# Patient Record
Sex: MALE | Race: BLACK OR AFRICAN AMERICAN | Employment: FULL TIME
[De-identification: names, ages, dates, MRNs, and addresses within clinical notes are randomized per-mention and may not be internally consistent; named-entity substitution may affect disease eponyms.]

---

## 2022-07-21 ENCOUNTER — HOSPITAL ENCOUNTER (OUTPATIENT)
Dept: MRI IMAGING | Facility: CLINIC | Age: 47
Discharge: HOME | End: 2022-07-23
Payer: COMMERCIAL

## 2022-07-21 DIAGNOSIS — S46.911A STRAIN OF RIGHT SHOULDER, INITIAL ENCOUNTER: ICD-10-CM

## 2022-07-21 PROCEDURE — 73221 MRI JOINT UPR EXTREM W/O DYE: CPT

## 2022-08-03 ENCOUNTER — OFFICE VISIT (OUTPATIENT)
Dept: ORTHOPEDIC SURGERY | Age: 47
End: 2022-08-03
Payer: COMMERCIAL

## 2022-08-03 VITALS
RESPIRATION RATE: 12 BRPM | HEIGHT: 69 IN | SYSTOLIC BLOOD PRESSURE: 156 MMHG | WEIGHT: 185 LBS | DIASTOLIC BLOOD PRESSURE: 94 MMHG | BODY MASS INDEX: 27.4 KG/M2 | HEART RATE: 60 BPM

## 2022-08-03 DIAGNOSIS — S43.421A SPRAIN OF RIGHT ROTATOR CUFF CAPSULE, INITIAL ENCOUNTER: Primary | ICD-10-CM

## 2022-08-03 DIAGNOSIS — S46.011A STRAIN OF TENDON OF RIGHT ROTATOR CUFF, INITIAL ENCOUNTER: ICD-10-CM

## 2022-08-03 DIAGNOSIS — M75.101 ROTATOR CUFF SYNDROME OF RIGHT SHOULDER: ICD-10-CM

## 2022-08-03 DIAGNOSIS — M25.511 RIGHT SHOULDER PAIN, UNSPECIFIED CHRONICITY: Primary | ICD-10-CM

## 2022-08-03 PROCEDURE — 99204 OFFICE O/P NEW MOD 45 MIN: CPT | Performed by: PHYSICIAN ASSISTANT

## 2022-08-03 ASSESSMENT — ENCOUNTER SYMPTOMS
CONSTIPATION: 0
RESPIRATORY NEGATIVE: 1
VOMITING: 0
COUGH: 0
DIARRHEA: 0
ABDOMINAL PAIN: 0
SHORTNESS OF BREATH: 0
APNEA: 0
NAUSEA: 0
CHEST TIGHTNESS: 0
ABDOMINAL DISTENTION: 0
COLOR CHANGE: 0

## 2022-08-03 NOTE — PROGRESS NOTES
[address]  [facility] SP[facility]TS MEDICINE  [address]  [facility] [address]  Dept: [phone]  Dept Fax: [phone]        Right Shoulder - New Patient- [facility]     Chief Complaint:     Chief Complaint   Patient presents with    Shoulder Pain     R Shoulder Pain     HPI:     Demetria Carty is a 48y.o. year old right hand dominant male that has had pain in the right shoulder since 7/9/2022. As far as any trauma to the shoulder, the patient indicates he was picking up a 65# battery when he dropped it and tried to catch it and put it up on a scrap pallet at the same time. He felt something \"pull\" in his shoulder. The pain is worse at night and when doing overhead activities. Weakness of the shoulder has been noted. The pain restricts activities such as movement, pushing/pulling, holding any weight away from his body. The pain does not seem to improve with time. The following interventions/medications have been tried heat, ice, advil, Tylenol. The patient has not had a corticosteroid injection. The patient has not tried physical therapy. The patient has not has surgery. The opposite shoulder is okay. Neck pain has not been present. Review of Systems   Constitutional:  Positive for activity change. Negative for appetite change, fatigue and fever. Respiratory: Negative. Negative for apnea, cough, chest tightness and shortness of breath. Cardiovascular: Negative. Negative for chest pain, palpitations and leg swelling. Gastrointestinal:  Negative for abdominal distention, abdominal pain, constipation, diarrhea, nausea and vomiting. Genitourinary:  Negative for difficulty urinating, dysuria and hematuria. Musculoskeletal:  Positive for arthralgias. Negative for gait problem, joint swelling and myalgias. Skin:  Negative for color change and rash. Neurological:  Positive for weakness.  Negative for dizziness, numbness and headaches. Psychiatric/Behavioral:  Positive for sleep disturbance. Past Medical History:    No past medical history on file. Past Surgical History:    No past surgical history on file. CurrentMedications:   No current outpatient medications on file. No current facility-administered medications for this visit. Allergies:    Patient has no known allergies. Social History:   Social History     Socioeconomic History    Marital status: Legally        Family History:  No family history on file. I have reviewed the CC, HPI, ROS, PMH, FHX, Social History, and if not present in this note, I have reviewed in the patient's chart. I agree with the documentation provided by other staff and have reviewed their documentation prior to providing my signature indicating agreement. Vitals:   BP (!) 156/94   Pulse 60   Resp 12   Ht 5' 9\" (1.753 m)   Wt 185 lb (83.9 kg)   BMI 27.32 kg/m²  Body mass index is 27.32 kg/m². Physical Examination:     Orthopedics:    GENERAL: Alert and oriented X3 in no acute distress. SKIN: Intact without lesions or ulcerations. NEURO: Musculoskeletal and axillary nerves intact to sensory and motor testing. VASC: Capillary refill is less than 3 seconds. Right Shoulder Exam    GEN: Alert and oriented X 3, in no acute distress. SKIN: Intact without rashes, lesions, or ulcerations. NEURO: Musculoskeletal ans axillary nerves intact to sensory and motor testing. VASC: Cap refill less than than 3 secs. Negative Adson's test, Negative Anita's test.  ROM: 90a/150p degrees of forward elevation, 40 degrees of external rotation in neutral, 90 degrees of external rotation in abduction, internal rotation to L1.    STRENGTH: Supraspinatus 4/5, external rotators 5/5. MUSC: No atrophy, negative subscap lift off or belly press test.  IMP: + Neer's sign, + Hawkin's sign, negative Coracoid impingement, + painful arc, + pain with cross body abduction.   PALP: no pain over anterolateral acromion, no pain over AC joint, no pain over traps/rhomboids. INST: negative Monson's test, mild Speed's test  Assessment:     1. Traumatic incomplete tear of right rotator cuff, initial encounter      Procedures:    Procedure: no  Radiology:   SHOULDER X-RAY    Two views of the right shoulder and 2 views of the scapula, including AP, scapular Y, outlet and axillary views reveal: The glenohumeral joint is well reduced no arthritic changes. Proximal migration of the humeral head has not occurred. Acromion is a type II. The acromioclavicular joint shows mild degenerative changes. Impression: Negative radiographs of the right shoulder     MRI SHOULDER RIGHT WO CONTRAST    Result Date: 7/21/2022  1. Less than 50% multifocal shallow partial-thickness articular surface and interstitial tearing of supraspinatus between musculotendinous junction and footplate. Moderate underlying supraspinatus tendinosis. 2. Mild infraspinatus tendinopathy. 3. No paralabral cyst formation or discrete labral tear. 4. Mild degenerative change of the right AC joint. Plan:   Treatment : I reviewed the x-ray and MRI with the patient and I informed them that the MRI does show a partial-thickness tear and interstitial tearing of the supraspinatus between the musculotendinous junction and footplate. He does also have moderate underlying supraspinatus tendinitis. Mild infraspinatus tendinopathy and some mild degenerative changes of the right AC joint. We discussed the etiologies and natural histories of partial-thickness rotator cuff tear. We discussed the various treatment alternatives including anti-inflammatory medications, physical therapy, injections, further imaging studies and as a last result surgery. During today's visit, we had a very long discussion regarding partial-thickness rotator cuff tears with tendinitis and bursitis. The patient was adamant thinking he needed to have a surgery.   I explained that with these findings on MRI it is definitely worth trying conservative treatments including a cortisone injection and physical therapy. That might be all that he needs to get better and get back to where he was prior to his injury. If he does not get better with these things then I will refer him to our shoulder surgeon Dr. Crow Hayward for his opinion. I offered a referral to Dr. Crow Hayward now for a second opinion if he was not happy with what I was telling him. He will remain on the same restrictions that he has right now through [facility]. At this time, the patient has opted for submitting a C9 for a cortisone injection and physical therapy. Once a cortisone injection and physical therapy is approved, the patient will come back and we will do a subacromial cortisone injection and then get him to physical therapy. I also included exercises in his after visit summary that he can begin now. He needs to be moving his shoulder as he is going to develop a frozen shoulder. I spent over 45 minutes with this patient going through his options. The patient will call the office immediately with any problems. No orders of the defined types were placed in this encounter. No orders of the defined types were placed in this encounter. This note is created with the assistance of a speech recognition program.  While intending to generate a document that actually reflects the content of the visit, the document can still have some errors including those of syntax and sound a like substitutions which may escape proof reading.   In such instances, actual meaning can be extrapolated by contextual diversion     Electronically signed by Bakari Hernandez PA-C, on 8/4/2022 at 5:57 PM

## 2022-08-16 ENCOUNTER — TELEPHONE (OUTPATIENT)
Dept: ORTHOPEDIC SURGERY | Age: 47
End: 2022-08-16

## 2022-08-16 NOTE — TELEPHONE ENCOUNTER
Unable to leave message that C9 has been approved for PT and cortisone inj.  C9 scanned in patients chart.

## 2022-08-23 ENCOUNTER — OFFICE VISIT (OUTPATIENT)
Dept: ORTHOPEDIC SURGERY | Age: 47
End: 2022-08-23
Payer: COMMERCIAL

## 2022-08-23 VITALS — WEIGHT: 185 LBS | HEIGHT: 69 IN | RESPIRATION RATE: 12 BRPM | BODY MASS INDEX: 27.4 KG/M2

## 2022-08-23 DIAGNOSIS — M75.101 ROTATOR CUFF SYNDROME OF RIGHT SHOULDER: Primary | ICD-10-CM

## 2022-08-23 PROCEDURE — 20611 DRAIN/INJ JOINT/BURSA W/US: CPT | Performed by: PHYSICIAN ASSISTANT

## 2022-08-23 PROCEDURE — 99213 OFFICE O/P EST LOW 20 MIN: CPT | Performed by: PHYSICIAN ASSISTANT

## 2022-08-23 RX ORDER — METHYLPREDNISOLONE ACETATE 40 MG/ML
40 INJECTION, SUSPENSION INTRA-ARTICULAR; INTRALESIONAL; INTRAMUSCULAR; SOFT TISSUE ONCE
Status: COMPLETED | OUTPATIENT
Start: 2022-08-23 | End: 2022-08-23

## 2022-08-23 RX ORDER — MELOXICAM 15 MG/1
15 TABLET ORAL DAILY
Qty: 30 TABLET | Refills: 0 | Status: SHIPPED | OUTPATIENT
Start: 2022-08-23 | End: 2022-09-22

## 2022-08-23 RX ORDER — LIDOCAINE HYDROCHLORIDE 10 MG/ML
2 INJECTION, SOLUTION INFILTRATION; PERINEURAL ONCE
Status: COMPLETED | OUTPATIENT
Start: 2022-08-23 | End: 2022-08-23

## 2022-08-23 RX ADMIN — METHYLPREDNISOLONE ACETATE 40 MG: 40 INJECTION, SUSPENSION INTRA-ARTICULAR; INTRALESIONAL; INTRAMUSCULAR; SOFT TISSUE at 14:05

## 2022-08-23 RX ADMIN — LIDOCAINE HYDROCHLORIDE 2 ML: 10 INJECTION, SOLUTION INFILTRATION; PERINEURAL at 14:05

## 2022-08-23 NOTE — PROGRESS NOTES
[address] [place] [address] AND SPORTS MEDICINE  [address] [place] [id number]  Dept: [phone]  Dept Fax: [phone]          Right Shoulder Visit - Follow up    Subjective:     Chief Complaint   Patient presents with    Shoulder Pain     R Shoulder Injection     HPI:     Jennifer Neff presents today for Right shoulder pain. Patient is here today for cortisone injections into Right shoulder. He has not had a cortisone injection in the past.  We did discuss doing a subacromial injection at this visit and going to physical therapy. If he does not get better with those things then we can discuss surgery and get a second opinion from Dr. Karrie Henry. I have reviewed the CC, and if not present in this note, I have reviewed in the patient's chart. I agree with the documentation provided by other staff and have reviewed their documentation prior to providing my signature indicating agreement. Vitals:   Resp 12   Ht 5' 9\" (1.753 m)   Wt 185 lb (83.9 kg)   BMI 27.32 kg/m²  Body mass index is 27.32 kg/m². Assessment:     1. Rotator cuff syndrome of right shoulder        Procedure:   Procedure: Yes    Subacromial Bursa Injection    Location: Right Shoulder  Procedure: I discussed in detail the risks, benefits and complications of an injection which included but are not limited to infection, skin reactions, hot swollen joint and anaphylaxis with the patient. The patient verbalized understanding and gave informed consent for the injection. The skin was prepped with betadine in a sterile fashion. Under these sterile conditions, a [facility] ultrasound unit with a variable frequency linear transducer was used for precise placement of a 22-gauge needle into the subacromial bursa.  A clean technique was utilized using sterile gloves and after prepping the patient under the stated sterile conditions, the patient was placed in the Seated position on the exam table. The posterior soft spot approximately 2 cm distal and 2 cm medial to the posterior acromial edge was identified and marked and a 3 cc solution containing 2 cc of 1% Lidocainewith 1 cc containing 80 mg of Depomedrol was injected into the subacromial space with the 22-gauge needle. The needled was withdrawn and the injection site was cleansed. A Band-Aid was placed over the injection site. There was no resistance to the injection and the patient tolerated the procedure well without difficulty. Adverse reactions of the injection was discussed with the patient including signs of infection, increasing pain, redness, swelling, warmth, fever, chills and the patient was instructed to call immediately with any of these symptoms. Successful needle placement was achieved and final images were taken and saved in the patient's chart for the permanent record. The images are stored on SD card in the machine until downloaded to the patient's chart. Plan:   I am also going to give him a prescription for meloxicam due to the stiffness that he is starting to exhibit due to not using his shoulder. I am concerned that he is going to develop a frozen shoulder if he can get the shoulder moving. I would like him to start physical therapy in 1 week and a prescription was provided. He will need to continue his same restrictions at work. Patient should return to the clinic in 6 weeks to follow up with Suzie Rodriguez PA-C. The patient will call the office immediately with any problems.       Orders Placed This Encounter   Medications    lidocaine 1 % injection 2 mL    methylPREDNISolone acetate (DEPO-MEDROL) injection 40 mg    meloxicam (MOBIC) 15 MG tablet     Sig: Take 1 tablet by mouth daily     Dispense:  30 tablet     Refill:  0       Orders Placed This Encounter   Procedures    Ambulatory referral to Physical Therapy     Referral Priority:   Routine     Referral Type:   Eval and Treat     Referral Reason:   Specialty Services Required     Requested Specialty:   Physical Therapist     Number of Visits Requested:   1           Electronically signed by Mar Link PA-C, on 8/23/2022 at 2:17 PM

## 2022-08-30 ENCOUNTER — HOSPITAL ENCOUNTER (OUTPATIENT)
Dept: PHYSICAL THERAPY | Facility: CLINIC | Age: 47
Setting detail: THERAPIES SERIES
Discharge: HOME | End: 2022-08-30
Payer: COMMERCIAL

## 2022-08-30 PROCEDURE — 97161 PT EVAL LOW COMPLEX 20 MIN: CPT

## 2022-08-30 PROCEDURE — 97110 THERAPEUTIC EXERCISES: CPT

## 2022-08-30 NOTE — CONSULTS
[x] THE [facility] &  Therapy  [facility]   [place]: [phone]  F: [phone]        Physical Therapy Upper Extremity Evaluation    Date:  2022  Patient: Adenike Bal    : 1975  MRN: [id number]  Physician: Kate Pak [facility]   Insurance: [facility] 8-3-22 to 9-15-22 for up to 12 visits, 4 units/visit MAX  Medical Diagnosis: RUE RTC Syndrome Rehab Codes: M75.101, M62.81 (Muscle Weakness), M62.9 (Disorder of Muscle), M79.1 (Myalgia)  Onset Date: 22  Next 's appt. : 10-4-22      Subjective:   CC/HPI: Pt is a 49 yo male with RUE shoulder pain that started on 22 while trying to lift a 65# battery and it slipped out of his hand, felt pain in the shoulder when he tried to catch the battery. He has been going to work with no RUE use as a restriction. He received a cortisone injection on 22 with little change in his symptoms. Wearing a right shoulder sling at arrival.     PMHx: Past Medical History:    Past Medical History   No past medical history on file. Past Surgical History:    Past Surgical History   No past surgical history on file. [x] Refer to full medical chart  In EPIC     Tests:   [x] MRI:    Result Date: 2022  1. Less than 50% multifocal shallow partial-thickness articular surface and interstitial tearing of supraspinatus between musculotendinous junction and footplate. Moderate underlying supraspinatus tendinosis. 2. Mild infraspinatus tendinopathy. 3. No paralabral cyst formation or discrete labral tear. 4. Mild degenerative change of the right AC joint. Medications: [x] Refer to full medical record [] None [] Other:  Allergies:      [x] Refer to full medical record [] None [] Other:    Function:  Hand Dominance  [x] Right  [] Left  Employment [employer]   Job status No RUE use restriction per Kate Pak [facility]   Work Activities/duties  Push/pull pallets, batteries        Pain present? yes   Location RUE shoulder anterior lateral    Pain Rating currently 0/10   Pain at worse 8/10   Pain at best 0/10   Description of pain Sharp, ache    Altered Sensation Intact    What makes it worse movement   What makes it better Ice, rest, meds, hot tub   Symptom progression    Sleep Roll on to the right arm increases pain in the shoulder to wake him up              Objective:      STRENGTH    Left Right   C5 Shld Abd  3-   Shld Flexion  3-   Shld IR  3+   Shld ER  3-   C6 Elb Flex  5   C7 Elb Ext  5             AROM PROM    Right Right   Shld Abd 78 95   Shld Flex 60 148   Shld IR 10 25   Shld ER 22 45               TESTS (+/-) LEFT RIGHT Not Tested   Neers  + []   Lei  + []   Empty Can  + []      []      []      []       OBSERVATION No Deficit Deficit Not Tested Comments   Posture       Forward Head [] [x] []    Rounded Shoulders [] [x] []    Kyphosis [] [x] []    Slumped Sitting [] [x] []            Flexibility Normal LUE Deficit RUE Deficit   UTrap [] [] [x]   L.Scap [] [] [x]   Scalenes  [] [] [x]   Pec Major [] [] [x]   Pec Minor [] [] [x]       FUNCTION Normal Difficult Unable   Lift/Carry [] [x] []   OH reach [] [x] []   Sit to Stand [x] [] []       Functional Test: Quick DASH Score: 95% functionally impaired         Assessment:    Patient would benefit from skilled physical therapy services in order to improve ROM, strength and flexibility to improve patient's overall RUE shoulder movement and return to work. Problems:    [x] ? Pain:  [x] ? ROM:  [x] ? Strength:          Goals  MET NOT MET ON-  GOING  Details   Date Addressed:        STG: To be met in 10 treatments           1. ? Pain: Decrease pain levels to 4/10 with ADLs []  []  []      2. ? ROM: Increase flexibility and AROM limitations throughout to equal bilat to reduce difficulty with ADLs []  []  []      3. ? Strength: Increase MMT to 5/5 throughout to ease functional limitations and mobility  []  []  []     4.  Independent with Home Exercise Programs []  []  []      []  []  []     Date Addressed:        LTG: To be met in 12 treatments       1. Improve score on assessment tool QUICK DASH from 95% impairment to less than 50% impairment  []  []  []     2. Reduce pain levels to 2/10 or less with ADLs []  []  []      []  []  []              Patient goals: decrease pain, improve ROM     Rehab Potential:  [x] Good  [] Fair  [] Poor   Suggested Professional Referral:  [x] No  [] Yes:  Barriers to Goal Achievement[de-identified]  [x] No  [] Yes:  Domestic Concerns:  [x] No  [] Yes:    Pt. Education:  [x] Plans/Goals, Risks/Benefits discussed  [x] Home exercise program  Method of Education: [x] Verbal  [x] Demo  [] Written  Comprehension of Education:  [x] Verbalizes understanding. [x] Demonstrates understanding. [x] Needs Review. [] Demonstrates/verbalizes understanding of HEP/Ed previously given. Access Code: [email or web address]  URL: [email or web address]  Date: 08/30/2022  Prepared by: Adalid Collier    Exercises  Supine Shoulder Flexion Extension AAROM with Dowel - 1 x daily - 7 x weekly - 10 reps - 3 sets  Supine Shoulder External Rotation with Dowel - 1 x daily - 7 x weekly - 10 reps - 3 sets  Supine Shoulder Abduction AAROM with Dowel - 1 x daily - 7 x weekly - 10 reps - 3 sets  Seated Scapular Retraction - 1 x daily - 7 x weekly - 10 reps - 3 sets  Shoulder Flexion Wall Slide with Towel - 2 x daily - 7 x weekly - 10 reps - 1 sets - 10 hold  Standing Cross Body Shoulder Stretch at Wall - 2 x daily - 7 x weekly - 10 reps - 1 sets - 10 hold      Treatment Plan:  [x] Therapeutic Exercise    [] Modalities:  [x] Therapeutic Activity    [] Ultrasound  [] Electrical Stimulation  [x] Gait Training     [] Lumbar/Cervical Traction  [x] Neuromuscular Re-education [] Cold/hotpack [] Iontophoresis: 4 mg/mL  [x] Instruction in HEP              Dexamethasone Sodium Phosphate 40-80 mAmin  [x] Manual Therapy             []  Aquatic Therapy       [x] Vasocompression: Odessa Silva  [] Other:    []  Medication allergies reviewed for use of    Dexamethasone Sodium Phosphate 4mg/ml     with iontophoresis treatments. Pt is not allergic. Frequency:  2 x/week for 12 visits      Todays Treatment:    Exercise   Reps/ Time Weight/ Level Comments         Bike             Crossbody adduction Stretch    HEP   Table slide flex      Table slide Abd      Wand Abd    HEP   Wand flexion AAROM    HEP   Wand ER AAROM    HEP         Table Slides - flexion/scaption    HEP                         Specific Instructions for next treatment: advance ROM, flexibility, strength     Evaluation Complexity:  History (Personal factors, comorbidities) [x] 0 [] 1-2 [] 3+   Exam (limitations, restrictions) [x] 1-2 [] 3 [] 4+   Clinical presentation (progression) [x] Stable [] Evolving  [] Unstable   Decision Making [x] Low [] Moderate [] High    [x] Low Complexity [] Moderate Complexity [] High Complexity       Treatment Charges: Mins Units Time                    [x] Evaluation       [x]  Low       []  Moderate       []  High 25 1 15:05-15:30   []  Modalities      [x]  Ther Exercise 10 1 15:30-15:40   []  Manual Therapy      []  Ther Activities      []  Aquatics      []  Vasocompression      []  Other 35 2 15:05-15:40     TOTAL TREATMENT TIME: 35    Time in: 1505      Time out: 700 Darin    Electronically signed by: Dana Lara PT        Physician Signature:________________________________Date:__________________  By signing above or cosigning this note, I have reviewed this plan of care and certify a need for medically necessary rehabilitation services.      *PLEASE SIGN ABOVE AND FAX BACK ALL PAGES*

## 2022-09-01 ENCOUNTER — HOSPITAL ENCOUNTER (OUTPATIENT)
Dept: PHYSICAL THERAPY | Facility: CLINIC | Age: 47
Setting detail: THERAPIES SERIES
Discharge: HOME | End: 2022-09-01
Payer: COMMERCIAL

## 2022-09-01 PROCEDURE — 97110 THERAPEUTIC EXERCISES: CPT

## 2022-09-01 NOTE — FLOWSHEET NOTE
[x] THE [facility] &  Therapy  [facility]   [place]: [phone]  F: [phone]      Physical Therapy Daily Treatment Note    Date:  2022  Patient Name:  Aby Magana    :  1975  MRN: [id number]  Physician: Yao Nelson [facility]                        Insurance: [facility] 8-3-22 to 9-15-22 for up to 12 visits, 4 units/visit MAX  Medical Diagnosis: RUE RTC Syndrome         Rehab Codes: M75.101, M62.81 (Muscle Weakness), M62.9 (Disorder of Muscle), M79.1 (Myalgia)  Onset Date: 22            Next 's appt. : 10-4-22    Visit# / total visits:      Cancels/No Shows:     Subjective:    Pain:  [] Yes  [x] No Location: RUE Shoulder Pain Rating: (0-10 scale) 0/10  Pain altered Tx:  [x] No  [] Yes  Action:  Comments: Patient arrived noting no pain rest but states pain up to 8/10 with movement. Objective:     Exercise    Reps/ Time Weight/ Level Comments             Bike   10'                 Crossbody adduction Stretch   3x30\"   HEP   Table slide flex  10x10\"       Table slide Abd  10x10\"       Table slide Scaption  10x10\"     Wand Abd  10x10\"   HEP   Wand flexion AAROM   10x10\"   HEP   Wand ER AAROM   10x10\"   HEP             Wall walks Fwd/Abd  10x10\"       Pulleys  2'/2'                                         Specific Instructions for next treatment: advance ROM, flexibility, strength        Treatment Charges: Mins Units Time In/Out   [x]  Ther Exercise 35 2 3:00pm-3:35pm   []  Manual Therapy      []  Vasocompression      []  Other      Total Treatment time 35 min 3 3:00pm-3:40pm        Assessment: [x] Progressing toward goals. Focused on ROM program this visit. Patient very limited in flexion and abduction due to pain. Patient very guarded with PROM limiting ability to properly stretch shoulder. Stressed importance of continued stretching and ROM program at home. Will plan to advance strength program next visit. [] No change.      [] Other:  [x] Patient would continue to benefit from skilled physical therapy services in order to: ROM, strength and flexibility to improve patient's overall RUE shoulder movement and return to work       Goals  MET NOT MET ON-  GOING  Details   Date Addressed:            STG: To be met in 10 treatments            1. ? Pain: Decrease pain levels to 4/10 with ADLs []  []  []      2. ? ROM: Increase flexibility and AROM limitations throughout to equal bilat to reduce difficulty with ADLs []  []  []      3. ? Strength: Increase MMT to 5/5 throughout to ease functional limitations and mobility  []  []  []      4. Independent with Home Exercise Programs []  []  []        []  []  []      Date Addressed:            LTG: To be met in 12 treatments           1. Improve score on assessment tool QUICK DASH from 95% impairment to less than 50% impairment  []  []  []      2. Reduce pain levels to 2/10 or less with ADLs []  []  []        []  []  []                     Patient goals: decrease pain, improve ROM       Pt. Education:  [x] Yes  [] No  [x] Reviewed Prior HEP/Ed: Stressed importance of continued stretching and ROM program at home. Method of Education: [x] Verbal  [] Demo  [] Written  Comprehension of Education:  [x] Verbalizes understanding. [] Demonstrates understanding. [] Needs review. [x] Demonstrates/verbalizes HEP/Ed previously given. Plan: [x] Continue current frequency toward long and short term goals. [x] Specific Instructions for subsequent treatments: Progress strength program and continue to focus on ROM.        Time In: 3:30pm             Time Out: 4:10pm    Electronically signed by:  Adenike Beasley PTA

## 2022-09-07 ENCOUNTER — HOSPITAL ENCOUNTER (OUTPATIENT)
Dept: PHYSICAL THERAPY | Facility: CLINIC | Age: 47
Setting detail: THERAPIES SERIES
Discharge: HOME | End: 2022-09-07
Payer: COMMERCIAL

## 2022-09-07 PROCEDURE — 97110 THERAPEUTIC EXERCISES: CPT

## 2022-09-07 NOTE — FLOWSHEET NOTE
[x] THE [facility] &  Therapy  [facility]   [place]: [phone]  F: [phone]      Physical Therapy Daily Treatment Note    Date:  2022  Patient Name:  Aidan Holley    :  1975  MRN: [id number]  Physician: La Hair, [facility]                        Insurance: [facility] 8-3-22 to 9-15-22 for up to 12 visits, 4 units/visit MAX  Medical Diagnosis: RUE RTC Syndrome         Rehab Codes: M75.101, M62.81 (Muscle Weakness), M62.9 (Disorder of Muscle), M79.1 (Myalgia)  Onset Date: 22            Next 's appt. : 10-4-22  Visit# / total visits: 3/12     Cancels/No Shows:     Subjective:    Pain:  [] Yes  [x] No Location: RUE Shoulder Pain Rating: (0-10 scale) not rated/10  Pain altered Tx:  [x] No  [] Yes  Action:  Comments: Patient arrived reporting soreness following the last session. Resolved after a few days, as long as he did not do anything to aggravate it.      Objective:  Precautions:   Exercise    Reps/ Time Weight/ Level Comments             Bike   7'              Corner pec Stretch   3x30\"   At 90 degrees    Crossbody adduction Stretch   3x30\"   HEP   Table slide flex        Table slide Abd        Supine       Wand Abd  10x10\"   HEP   Wand flexion AAROM   10x10\"   HEP   Wand ER AAROM   10x10\"   HEP             Wall slides Fwd/Abd  10x10\"   With towel    Pulleys  2'   Abduction only              Prone       Scapular retractions  15x5\"                 Tband       Rows x20 Lime     Ext x20 Lime     Bilat ER  x20 Lime     Bilat horiz abd  x10 Lime           Closed chain scapular protraction  x10  In push up position on the wall  Unable to push up due to pain    Serratus punches  2x10    2# Added weight to second set             Other: MFR to the R pectoralis minor, R subscapularis, and R latissimus dorsi      Specific Instructions for next treatment: advance ROM, flexibility, strength, inferior GH glides       Treatment Charges: Mins Units   [] Modalities     [x]  Ther Exercise 45 3   [x]  Manual Therapy 5 --   []  Ther Activities     []  Aquatics     []  Vasocompression     []  Other     Total Treatment time 50 3       Assessment: [x] Progressing toward goals. Improved flexion and ER ROM this date, though patient continues to have difficulty with passive and active abduction. Added Theraband exercises without complaints of pain. He has significant muscle guarding with tenderness to light touch when attempting manual muscle release. R scapular winging noted during resisted horizontal abduction, added serratus strengthening this date. Discussed posture and he demonstrates a rounded R shoulder compared to the L.     [] No change. [] Other:  [x] Patient would continue to benefit from skilled physical therapy services in order to: ROM, strength and flexibility to improve patient's overall RUE shoulder movement and return to work       Goals  MET NOT MET ON-  GOING  Details   Date Addressed:            STG: To be met in 10 treatments            1. ? Pain: Decrease pain levels to 4/10 with ADLs []  []  []      2. ? ROM: Increase flexibility and AROM limitations throughout to equal bilat to reduce difficulty with ADLs []  []  []      3. ? Strength: Increase MMT to 5/5 throughout to ease functional limitations and mobility  []  []  []      4. Independent with Home Exercise Programs []  []  []        []  []  []      Date Addressed:            LTG: To be met in 12 treatments           1. Improve score on assessment tool QUICK DASH from 95% impairment to less than 50% impairment  []  []  []      2. Reduce pain levels to 2/10 or less with ADLs []  []  []        []  []  []                     Patient goals: decrease pain, improve ROM       Pt. Education:  [x] Yes  [] No  [x] Reviewed Prior HEP/Ed: Stressed importance of continued stretching and ROM program at home.    Method of Education: [x] Verbal  [] Demo  [x] Written    Access Code: [email or web address]  URL: [email or web address]  Date: 09/07/2022  Prepared by: Karine You    Exercises  Doorway Pec Stretch at 90 Degrees Abduction - 2 x daily - 7 x weekly - 3 sets - 30 (sec) hold  Standing Shoulder Row with Anchored Resistance - 1 x daily - 7 x weekly - 3 sets - 10 reps  Shoulder Extension with Resistance - 1 x daily - 7 x weekly - 3 sets - 10 reps  Standing Shoulder External Rotation with Resistance - 1 x daily - 7 x weekly - 3 sets - 10 reps  Standing Shoulder Horizontal Abduction with Resistance - 1 x daily - 7 x weekly - 3 sets - 10 reps  Supine Scapular Protraction in Flexion with Dumbbells - 1 x daily - 7 x weekly - 2 sets - 10 reps  Prone Scapular Retraction - 1 x daily - 7 x weekly - 1 sets - 10 reps - 5 hold  Shoulder Flexion Wall Slide with Towel - 1 x daily - 7 x weekly - 2 sets - 10 reps  Standing Shoulder Abduction Slides at Wall - 1 x daily - 7 x weekly - 2 sets - 10 reps      Comprehension of Education:  [x] Verbalizes understanding. [] Demonstrates understanding. [] Needs review. [x] Demonstrates/verbalizes HEP/Ed previously given. Plan: [x] Continue current frequency toward long and short term goals. [x] Specific Instructions for subsequent treatments: Progress strength program and continue to focus on ROM.        Time In: 1504            Time Out: Braeden 72    Electronically signed by:  Karine You, PT

## 2022-09-09 ENCOUNTER — HOSPITAL ENCOUNTER (OUTPATIENT)
Dept: PHYSICAL THERAPY | Facility: CLINIC | Age: 47
Setting detail: THERAPIES SERIES
Discharge: HOME | End: 2022-09-09
Payer: COMMERCIAL

## 2022-09-09 PROCEDURE — 97140 MANUAL THERAPY 1/> REGIONS: CPT

## 2022-09-09 PROCEDURE — 97110 THERAPEUTIC EXERCISES: CPT

## 2022-09-09 PROCEDURE — 97016 VASOPNEUMATIC DEVICE THERAPY: CPT

## 2022-09-09 NOTE — FLOWSHEET NOTE
[x] THE [facility] &  Therapy  [facility]   [place]: [phone]  F: [phone]      Physical Therapy Daily Treatment Note    Date:  2022  Patient Name:  Jennifer Neff    :  1975  MRN: [id number]  Physician: Niya Luna, [facility]                        Insurance: [facility] 8-3-22 to 9-15-22 for up to 12 visits, 4 units/visit MAX  Medical Diagnosis: RUE RTC Syndrome         Rehab Codes: M75.101, M62.81 (Muscle Weakness), M62.9 (Disorder of Muscle), M79.1 (Myalgia)  Onset Date: 22            Next 's appt. : 10-4-22  Visit# / total visits:      Cancels/No Shows:     Subjective:    Pain:  [] Yes  [x] No Location: RUE Shoulder Pain Rating: (0-10 scale) 0/10  Pain altered Tx:  [x] No  [] Yes  Action:  Comments: Patient arrived reporting increased soreness and reports pain if he goes past a certain ROM. Patient states he felt worse after last visit.       Objective:  Precautions:   Exercise    Reps/ Time Weight/ Level Comments             Pulleys  Next      Bike   10'    Discontinue next         Wall slides flexion  10x10\"  HEP-With towel   Wall slides abduction  10x10\"  HEP-With towel   Corner pec Stretch   3x30\"   HEP-At 90 degrees    Crossbody adduction Stretch  NT   HEP   Table slide flex NT       Table slide Abd NT             Supine       Wand Abd  NT   HEP   Wand flexion AAROM   attempted   HEP   Wand ER AAROM   NT   HEP             Prone Retrain       Scapular retractions  NT  HEP         Tband       Rows x20 Lime  HEP   Ext x20 Lime  HEP   ER  x20 Lime  HEP   IR x20 Lime          Serratus punches  NT 2#             MFR to the R pectoralis minor, R subscapularis  R inferior joint glides to improve abduction ROM   Vasocompression 15 min mod pressure 34 degrees R shoulder-supine      Specific Instructions for next treatment:       Treatment Charges: Mins Units Time in-Time out   []  Modalities      [x]  Ther Exercise 20 1 3:15pm-3:35pm   [x]  Manual Therapy 10 1 3:35pm-3:45pm   []  Ther Activities      []  Aquatics      [x]  Vasocompression 15 1 3:45pm-4:00pm   []  Other      Total Treatment time 45 3 3:05pm-4:00pm   Warm-up on Bike not billed    Assessment: [x] Progressing toward goals. Initiated with warm-up on bike, patient with little to no movement in UE's, therefore, will discontinue next visit and perform pulleys to focus on ROM in shoulders. Patient with pain with all ROM exercises demonstrating 90 degrees of shoulder flexion and abduction with wall walks, therefore, added GH joint glides. Addressed muscle tension at end of session with patient becoming tearful due to pain. Able to get further with PROM compared to AROM with patient noting less pain with PROM. Patient demonstrates visual pain and reports \"catching\" in R shoulder with wand flexion, therefore, held. Educated patient to perform pect stretches more frequently and self correct posture throughout the day due to forward head and shoulders resulting in poor posture. Increased pain with resisted ER, able to complete all reps. Ended with vaso to decrease pain at end of session rating 7/10 pain. Will continue to address manual and progress UE strength and ROM. [] No change. [] Other:  [x] Patient would continue to benefit from skilled physical therapy services in order to: ROM, strength and flexibility to improve patient's overall RUE shoulder movement and return to work       Goals  MET NOT MET ON-  GOING  Details   Date Addressed:            STG: To be met in 10 treatments            1. ? Pain: Decrease pain levels to 4/10 with ADLs []  []  []      2. ? ROM: Increase flexibility and AROM limitations throughout to equal bilat to reduce difficulty with ADLs []  []  []      3. ? Strength: Increase MMT to 5/5 throughout to ease functional limitations and mobility  []  []  []      4.  Independent with Home Exercise Programs []  []  []        []  []  []      Date Addressed:            LTG: To be met in 12 treatments           1. Improve score on assessment tool QUICK DASH from 95% impairment to less than 50% impairment  []  []  []      2. Reduce pain levels to 2/10 or less with ADLs []  []  []        []  []  []                     Patient goals: decrease pain, improve ROM       Pt. Education:  [x] Yes  [] No  [x] Reviewed Prior HEP/Ed: Stressed importance of continued stretching and ROM program at home. Method of Education: [x] Verbal  [] Demo  [x] Written    Access Code: [email or web address]  URL: [email or web address]  Date: 09/07/2022  Prepared by: Heena Botello    Exercises  Doorway Pec Stretch at 90 Degrees Abduction - 2 x daily - 7 x weekly - 3 sets - 30 (sec) hold  Standing Shoulder Row with Anchored Resistance - 1 x daily - 7 x weekly - 3 sets - 10 reps  Shoulder Extension with Resistance - 1 x daily - 7 x weekly - 3 sets - 10 reps  Standing Shoulder External Rotation with Resistance - 1 x daily - 7 x weekly - 3 sets - 10 reps  Standing Shoulder Horizontal Abduction with Resistance - 1 x daily - 7 x weekly - 3 sets - 10 reps  Supine Scapular Protraction in Flexion with Dumbbells - 1 x daily - 7 x weekly - 2 sets - 10 reps  Prone Scapular Retraction - 1 x daily - 7 x weekly - 1 sets - 10 reps - 5 hold  Shoulder Flexion Wall Slide with Towel - 1 x daily - 7 x weekly - 2 sets - 10 reps  Standing Shoulder Abduction Slides at Wall - 1 x daily - 7 x weekly - 2 sets - 10 reps      Comprehension of Education:  [x] Verbalizes understanding. [] Demonstrates understanding. [] Needs review. [x] Demonstrates/verbalizes HEP/Ed previously given. Plan: [x] Continue current frequency toward long and short term goals. [x] Specific Instructions for subsequent treatments: Progress strength program and continue to focus on ROM.        Time In: 3:05pm          Time Out: 4:00pm    Electronically signed by:  Abel Nicholson PTA

## 2022-09-12 ENCOUNTER — HOSPITAL ENCOUNTER (OUTPATIENT)
Dept: PHYSICAL THERAPY | Facility: CLINIC | Age: 47
Setting detail: THERAPIES SERIES
Discharge: HOME | End: 2022-09-12
Payer: COMMERCIAL

## 2022-09-12 PROCEDURE — 97110 THERAPEUTIC EXERCISES: CPT

## 2022-09-12 PROCEDURE — 97016 VASOPNEUMATIC DEVICE THERAPY: CPT

## 2022-09-12 NOTE — FLOWSHEET NOTE
[x] THE [facility] &  Therapy  [facility]   [place]: [phone]  F: [phone]      Physical Therapy Daily Treatment Note    Date:  2022  Patient Name:  Mary Carmen Aiken    :  1975  MRN: [id number]  Physician: Lasha Hernandez, [facility]                        Insurance: [facility] 8-3-22 to 9-15-22 for up to 12 visits, 4 units/visit MAX  Medical Diagnosis: RUE RTC Syndrome         Rehab Codes: M75.101, M62.81 (Muscle Weakness), M62.9 (Disorder of Muscle), M79.1 (Myalgia)  Onset Date: 22            Next 's appt. : 10-4-22    Visit# / total visits:      Cancels/No Shows:     Subjective:    Pain:  [x] Yes  [] No Location: RUE Shoulder Pain Rating: (0-10 scale) 6/10  Pain altered Tx:  [x] No  [] Yes  Action:  Comments: Patient arrived noting continued pain and soreness in RUE. Notes increased pain upon arrival this date with increased pain level per previous visit.      Objective:  Precautions:   Exercise    Reps/ Time Weight/ Level Comments             Pulleys  Next      Bike   10'    Discontinue next         Wall slides flexion  10x10\"  HEP-With towel   Wall slides abduction  10x10\"  HEP-With towel   Corner pec Stretch   3x30\"   HEP-At 90 degrees    Crossbody adduction Stretch  NT   HEP   Table slide flex 10x10\"       Table slide Scap 10x10\"             Supine       Wand Abd  NT   HEP   Wand flexion AAROM   attempted   HEP   Wand ER AAROM   NT   HEP             Prone Retrain       Scapular retractions  NT  HEP         Band       Rows x20 Lime  HEP   Ext x20 Lime  HEP   ER  x20 Lime  HEP   IR x20 Lime          Serratus punches  NT 2#             R inferior joint glides to improve abduction ROM   Vasocompression 15 min mod pressure 34 degrees R shoulder-supine         Treatment Charges: Mins Units Time In/Out   [x]  Ther Exercise 30 2 3:00pm-3:30pm   []  Manual Therapy      [x]  Vasocompression 15 1 3:36pm-3:51pm   []  Other      Total Treatment time 45 min 3 3:00pm-3:56pm          Assessment: [x] Progressing toward goals. Continued with ROM progressions this date. Patient showing improved pain free A/PROM this date. Full ER noted this date with no associated pain. Improved range noted with flexion, abduction and ER with pain still present at end ranges. Will continue with ROM progressions per patients tolerance. [] No change. [] Other:  [x] Patient would continue to benefit from skilled physical therapy services in order to: ROM, strength and flexibility to improve patient's overall RUE shoulder movement and return to work       Goals  MET NOT MET ON-  GOING  Details   Date Addressed:            STG: To be met in 10 treatments            1. ? Pain: Decrease pain levels to 4/10 with ADLs []  []  []      2. ? ROM: Increase flexibility and AROM limitations throughout to equal bilat to reduce difficulty with ADLs []  []  []      3. ? Strength: Increase MMT to 5/5 throughout to ease functional limitations and mobility  []  []  []      4. Independent with Home Exercise Programs []  []  []        []  []  []      Date Addressed:            LTG: To be met in 12 treatments           1. Improve score on assessment tool QUICK DASH from 95% impairment to less than 50% impairment  []  []  []      2. Reduce pain levels to 2/10 or less with ADLs []  []  []        []  []  []                     Patient goals: decrease pain, improve ROM       Pt. Education:  [x] Yes  [] No  [x] Reviewed Prior HEP/Ed:   Method of Education: [x] Verbal  [] Demo  [] Written  Comprehension of Education:  [x] Verbalizes understanding. [] Demonstrates understanding. [] Needs review. [x] Demonstrates/verbalizes HEP/Ed previously given. Plan: [x] Continue current frequency toward long and short term goals. [x] Specific Instructions for subsequent treatments: Progress strength program and continue to focus on ROM.        Time In: 3:00pm          Time Out: 3:56pm    Electronically signed by: Kimberly Hernandez, PTA

## 2022-09-14 ENCOUNTER — HOSPITAL ENCOUNTER (OUTPATIENT)
Dept: PHYSICAL THERAPY | Facility: CLINIC | Age: 47
Setting detail: THERAPIES SERIES
Discharge: HOME | End: 2022-09-14
Payer: COMMERCIAL

## 2022-09-14 PROCEDURE — 97110 THERAPEUTIC EXERCISES: CPT

## 2022-09-14 PROCEDURE — 97016 VASOPNEUMATIC DEVICE THERAPY: CPT

## 2022-09-14 NOTE — FLOWSHEET NOTE
[x] THE [facility] &  Therapy  [facility]   [place]: [phone]  F: [phone]      Physical Therapy Daily Treatment Note    Date:  2022  Patient Name:  May Lagos    :  1975  MRN: [id number]  Physician: Brooks Guzman, [facility]                        Insurance: [facility] 8-3-22 to 9-15-22 for up to 12 visits, 4 units/visit MAX  Medical Diagnosis: RUE RTC Syndrome         Rehab Codes: M75.101, M62.81 (Muscle Weakness), M62.9 (Disorder of Muscle), M79.1 (Myalgia)  Onset Date: 22            Next 's appt. : 10-4-22    Visit# / total visits:      Cancels/No Shows:     Subjective:    Pain:  [x] Yes  [] No Location: RUE Shoulder Pain Rating: (0-10 scale) 5/10  Pain altered Tx:  [x] No  [] Yes  Action:  Comments: Patient arrived noting continue pain and soreness in RUE with no significant change in symptoms. Objective:  Precautions:   Exercise    Reps/ Time Weight/ Level Comments             Pulleys  4'/4'           Wall slides flexion  10x10\"  HEP-With towel   Wall slides abduction  10x10\"  HEP-With towel   Corner pec Stretch   3x30\"   HEP-At 90 degrees    Table slide flex 10x10\"       Table slide Scap 10x10\"             Supine       Wand Abd  10x10\"   HEP   Wand flexion AAROM   10x10\"   HEP   Wand ER AAROM   10x10\"   HEP             Band       Rows x20 Lime  HEP   Ext x20 Lime  HEP   ER  x20 Lime  HEP   IR x20 Lime          Serratus punches  2x10 2#             R inferior joint glides to improve abduction ROM   Vasocompression 15 min mod pressure 34 degrees R shoulder-supine         Treatment Charges: Mins Units Time In/Out   [x]  Ther Exercise 35 2 3:00pm-3:35pm   []  Manual Therapy      [x]  Vasocompression 15 1 3:36pm-3:51pm   []  Other      Total Treatment time 45 min 3 3:00pm-3:56pm          Assessment: [x] Progressing toward goals. Continued strength and ROM progressions this date.  Soreness noted throughout strength program but able to complete. Improved ROM with improved tolerance noted this date with PROM. Less guarding overall noted throughout program. Continued with vasocompression to decrease pain/soreness. Awaiting date extension throughout workers comp to schedule additional visits. [] No change. [] Other:  [x] Patient would continue to benefit from skilled physical therapy services in order to: ROM, strength and flexibility to improve patient's overall RUE shoulder movement and return to work       Goals  MET NOT MET ON-  GOING  Details   Date Addressed:            STG: To be met in 10 treatments            1. ? Pain: Decrease pain levels to 4/10 with ADLs []  []  []      2. ? ROM: Increase flexibility and AROM limitations throughout to equal bilat to reduce difficulty with ADLs []  []  []      3. ? Strength: Increase MMT to 5/5 throughout to ease functional limitations and mobility  []  []  []      4. Independent with Home Exercise Programs []  []  []        []  []  []      Date Addressed:            LTG: To be met in 12 treatments           1. Improve score on assessment tool QUICK DASH from 95% impairment to less than 50% impairment  []  []  []      2. Reduce pain levels to 2/10 or less with ADLs []  []  []        []  []  []                     Patient goals: decrease pain, improve ROM       Pt. Education:  [x] Yes  [] No  [x] Reviewed Prior HEP/Ed: Continue stretching and ROM program as tolerated. Method of Education: [x] Verbal  [] Demo  [] Written  Comprehension of Education:  [x] Verbalizes understanding. [] Demonstrates understanding. [] Needs review. [x] Demonstrates/verbalizes HEP/Ed previously given. Plan: [x] Continue current frequency toward long and short term goals. [x] Specific Instructions for subsequent treatments: Progress strength program and continue to focus on ROM.        Time In: 3:00pm          Time Out: 3:56pm    Electronically signed by:  Tadeo Stoll PTA

## 2022-09-28 ENCOUNTER — HOSPITAL ENCOUNTER (OUTPATIENT)
Dept: PHYSICAL THERAPY | Facility: CLINIC | Age: 47
Setting detail: THERAPIES SERIES
Discharge: HOME | End: 2022-09-28
Payer: COMMERCIAL

## 2022-09-28 PROCEDURE — 97110 THERAPEUTIC EXERCISES: CPT

## 2022-09-28 NOTE — FLOWSHEET NOTE
[x] THE [facility] &  Therapy  [facility]   [place]: [phone]  F: [phone]      Physical Therapy Daily Treatment Note    Date:  2022  Patient Name:  Gurjit Ball    :  1975  MRN: [id number]  Physician: Erica Rosa, [facility]                        Insurance: [facility] 8-3-22 to 9-15-22 for up to 12 visits, 4 units/visit MAX - Date extended to 10-15-22  Medical Diagnosis: RUE RTC Syndrome         Rehab Codes: M75.101, M62.81 (Muscle Weakness), M62.9 (Disorder of Muscle), M79.1 (Myalgia)  Onset Date: 22            Next 's appt. : 10-4-22    Visit# / total visits:      Cancels/No Shows:     Subjective:    Pain:  [x] Yes  [] No Location: RUE Shoulder Pain Rating: (0-10 scale) 6/10  Pain altered Tx:  [x] No  [] Yes  Action:  Comments: Patient arrived 2 weeks since previous visit due to awaiting a date extension from [employer]. Notes continued pain in R shoulder with motion and during the night. Objective:  Precautions:   Exercise    Reps/ Time Weight/ Level Comments             Pulleys  4'/4'           Wall slides flexion  10x10\"  HEP-With towel   Wall slides abduction  10x10\"  HEP-With towel   Corner pec Stretch   3x30\"   HEP-At 90 degrees    Table slide flex 10x10\"       Table slide Scap 10x10\"             Supine       Wand Abd  10x10\"   HEP   Wand flexion AAROM   10x10\"   HEP   Wand ER AAROM   10x10\"   HEP             Band       Rows x20 Lime  HEP   Ext x20 Lime  HEP   ER  x20 Lime  HEP   IR x20 Lime          Serratus punches  2x10 2#             R inferior joint glides to improve abduction ROM   Vasocompression 15 min mod pressure 34 degrees R shoulder-supine         Treatment Charges: Mins Units Time In/Out   [x]  Ther Exercise 40 3 3:30pm-4:10pm   []  Manual Therapy      []  Vasocompression      []  Other      Total Treatment time 40 min 3 3:30pm-4:19pm          Assessment: [x] Progressing toward goals.  Resumed strength and ROM program this date. Patient continues to notes at end ranges but does not appear to have lost any ROM. Full ER/IR ROM noted this date with no associated pain. Continued discomfort noted at end ranges with flexion and abduction. Will continue to progress strength program next visit per patients tolerance. [] No change. [] Other:  [x] Patient would continue to benefit from skilled physical therapy services in order to: ROM, strength and flexibility to improve patient's overall RUE shoulder movement and return to work       Goals  MET NOT MET ON-  GOING  Details   Date Addressed:            STG: To be met in 10 treatments            1. ? Pain: Decrease pain levels to 4/10 with ADLs []  []  []      2. ? ROM: Increase flexibility and AROM limitations throughout to equal bilat to reduce difficulty with ADLs []  []  []      3. ? Strength: Increase MMT to 5/5 throughout to ease functional limitations and mobility  []  []  []      4. Independent with Home Exercise Programs []  []  []        []  []  []      Date Addressed:            LTG: To be met in 12 treatments           1. Improve score on assessment tool QUICK DASH from 95% impairment to less than 50% impairment  []  []  []      2. Reduce pain levels to 2/10 or less with ADLs []  []  []        []  []  []                     Patient goals: decrease pain, improve ROM       Pt. Education:  [x] Yes  [] No  [x] Reviewed Prior HEP/Ed: Continue stretching and ROM program as tolerated. Method of Education: [x] Verbal  [] Demo  [] Written  Comprehension of Education:  [x] Verbalizes understanding. [] Demonstrates understanding. [] Needs review. [x] Demonstrates/verbalizes HEP/Ed previously given. Plan: [x] Continue current frequency toward long and short term goals. [x] Specific Instructions for subsequent treatments: Progress strength program and continue to focus on ROM.        Time In: 3:30pm           Time Out: 4:19pm    Electronically signed by:  Alverto Barr [address], PTA

## 2022-09-29 ENCOUNTER — HOSPITAL ENCOUNTER (OUTPATIENT)
Dept: PHYSICAL THERAPY | Facility: CLINIC | Age: 47
Setting detail: THERAPIES SERIES
Discharge: HOME | End: 2022-09-29
Payer: COMMERCIAL

## 2022-09-29 PROCEDURE — 97016 VASOPNEUMATIC DEVICE THERAPY: CPT

## 2022-09-29 PROCEDURE — 97110 THERAPEUTIC EXERCISES: CPT

## 2022-09-29 NOTE — FLOWSHEET NOTE
[x] THE [facility] &  Therapy  [facility]   [place]: [phone]  F: [phone]      Physical Therapy Daily Treatment Note    Date:  2022  Patient Name:  Demetris Pierre    :  1975  MRN: [id number]  Physician: Grant Lara, [facility]                        Insurance: [facility] 8-3-22 to 9-15-22 for up to 12 visits, 4 units/visit MAX - Date extended to 10-15-22  Medical Diagnosis: RUE RTC Syndrome         Rehab Codes: M75.101, M62.81 (Muscle Weakness), M62.9 (Disorder of Muscle), M79.1 (Myalgia)  Onset Date: 22            Next 's appt. : 10-4-22    Visit# / total visits:      Cancels/No Shows:     Subjective:    Pain:  [x] Yes  [] No Location: RUE Shoulder Pain Rating: (0-10 scale) 6/10  Pain altered Tx:  [x] No  [] Yes  Action:  Comments: Patient arrived noting increased soreness limiting ROM after restarting therapy treatments yesterday. Objective:  Precautions:   Exercise    Reps/ Time Weight/ Level Comments             Pulleys  4'/4'           Wall slides flexion  10x10\"  HEP-With towel   Wall slides abduction  10x10\"  HEP-With towel   Corner pec Stretch   3x30\"   HEP-At 90 degrees    Table slide flex 10x10\"       Table slide Scap 10x10\"             Supine       Wand Abd  10x10\"   HEP   Wand flexion AAROM   10x10\"   HEP   Wand ER AAROM   10x10\"   HEP             Band       Rows x20 Lime  HEP   Ext x20 Lime  HEP   ER  x20 Lime  HEP   IR x20 Lime          Serratus punches  2x10 2#             R inferior joint glides to improve abduction ROM   Vasocompression 15 min mod pressure 34 degrees R shoulder-supine         Treatment Charges: Mins Units Time In/Out   [x]  Ther Exercise 35 2 3:30pm-4:05pm   []  Manual Therapy      [x]  Vasocompression 15 1 4:05pm-4:20pm   []  Other      Total Treatment time 40 min 3 3:30pm-4:20pm          Assessment: [x] Progressing toward goals. Continued with ROM progressions this date.  Patient noted continued soreness and guarding throughout treatment due to soreness. No significant decrease in ROM noted just increased soreness noted by patient. Ended with vaso compression to address residual soreness. [] No change. [] Other:  [x] Patient would continue to benefit from skilled physical therapy services in order to: ROM, strength and flexibility to improve patient's overall RUE shoulder movement and return to work       Goals  MET NOT MET ON-  GOING  Details   Date Addressed:            STG: To be met in 10 treatments            1. ? Pain: Decrease pain levels to 4/10 with ADLs []  []  []      2. ? ROM: Increase flexibility and AROM limitations throughout to equal bilat to reduce difficulty with ADLs []  []  []      3. ? Strength: Increase MMT to 5/5 throughout to ease functional limitations and mobility  []  []  []      4. Independent with Home Exercise Programs []  []  []        []  []  []      Date Addressed:            LTG: To be met in 12 treatments           1. Improve score on assessment tool QUICK DASH from 95% impairment to less than 50% impairment  []  []  []      2. Reduce pain levels to 2/10 or less with ADLs []  []  []        []  []  []                     Patient goals: decrease pain, improve ROM       Pt. Education:  [x] Yes  [] No  [x] Reviewed Prior HEP/Ed: Continue ROM and stretching per tolerance. Method of Education: [x] Verbal  [] Demo  [] Written  Comprehension of Education:  [x] Verbalizes understanding. [] Demonstrates understanding. [] Needs review. [x] Demonstrates/verbalizes HEP/Ed previously given. Plan: [x] Continue current frequency toward long and short term goals. [x] Specific Instructions for subsequent treatments: Progress strength program and continue to focus on ROM.        Time In: 3:30pm           Time Out: 4:20pm    Electronically signed by:  Justin Arvizu PTA

## 2022-10-03 ENCOUNTER — HOSPITAL ENCOUNTER (OUTPATIENT)
Dept: PHYSICAL THERAPY | Facility: CLINIC | Age: 47
Setting detail: THERAPIES SERIES
Discharge: HOME | End: 2022-10-03
Payer: COMMERCIAL

## 2022-10-03 PROCEDURE — 97016 VASOPNEUMATIC DEVICE THERAPY: CPT

## 2022-10-03 PROCEDURE — 97110 THERAPEUTIC EXERCISES: CPT

## 2022-10-03 NOTE — FLOWSHEET NOTE
[x] THE [facility] &  Therapy  [facility]   [place]: [phone]  F: [phone]      Physical Therapy Daily Treatment Note    Date:  10/3/2022  Patient Name:  Izabela Oreilly    :  1975  MRN: [id number]  Physician: Jojo Kelly, [facility]                        Insurance: [facility] 8-3-22 to 9-15-22 for up to 12 visits, 4 units/visit MAX - Date extended to 10-15-22  Medical Diagnosis: RUE RTC Syndrome         Rehab Codes: M75.101, M62.81 (Muscle Weakness), M62.9 (Disorder of Muscle), M79.1 (Myalgia)  Onset Date: 22            Next 's appt. : 10-4-22    Visit# / total visits:      Cancels/No Shows:     Subjective:    Pain:  [x] Yes  [] No Location: RUE Shoulder Pain Rating: (0-10 scale) 6/10  Pain altered Tx:  [x] No  [] Yes  Action:  Comments: Patient arrived noting increased soreness limiting ROM after restarting therapy treatments yesterday.      Objective:  Precautions:   Exercise    Reps/ Time Weight/ Level Comments             Pulleys  4'/4'           Wall slides flexion  10x10\"  HEP-With towel   Wall slides abduction  10x10\"  HEP-With towel   Corner pec Stretch   3x30\"   HEP-At 90 degrees    Table slide flex 10x10\"       Table slide Scap 10x10\"             Supine       Wand Abd  10x10\"   HEP   Wand flexion AAROM   10x10\"   HEP   Wand ER AAROM   10x10\"   HEP             Band       Rows x20 Lime  HEP   Ext x20 Lime  HEP   ER  x20 Lime  HEP   IR x20 Lime          Serratus punches  2x10 2#          Standing      Flexion 2x10     Scaption 2x10     Abduction 2x10  Limited motion due to pain                                                R inferior joint glides to improve abduction ROM   Vasocompression 15 min mod pressure 34 degrees R shoulder-supine         Treatment Charges: Mins Units Time In/Out   [x]  Ther Exercise 40 3 3:30pm-4:10pm   []  Manual Therapy      [x]  Vasocompression 15 1 4:15pm-4:30pm   []  Other      Total Treatment time 40 min 3 3:30pm-4:30pm          Assessment: [x] Progressing toward goals. Progressed strength program this visit. Patient continues to note discomfort with overhead motion. Most discomfort noted with abduction with AROM limited to below 90° due to pain. Able to get patient past 90° with PROM with only discomfort noted at end range. Will continue to focus on pain free ROM with AROM strengthening. [] No change. [] Other:  [x] Patient would continue to benefit from skilled physical therapy services in order to: ROM, strength and flexibility to improve patient's overall RUE shoulder movement and return to work       Goals  MET NOT MET ON-  GOING  Details   Date Addressed:            STG: To be met in 10 treatments            1. ? Pain: Decrease pain levels to 4/10 with ADLs []  []  []      2. ? ROM: Increase flexibility and AROM limitations throughout to equal bilat to reduce difficulty with ADLs []  []  []      3. ? Strength: Increase MMT to 5/5 throughout to ease functional limitations and mobility  []  []  []      4. Independent with Home Exercise Programs []  []  []        []  []  []      Date Addressed:            LTG: To be met in 12 treatments           1. Improve score on assessment tool QUICK DASH from 95% impairment to less than 50% impairment  []  []  []      2. Reduce pain levels to 2/10 or less with ADLs []  []  []        []  []  []                     Patient goals: decrease pain, improve ROM       Pt. Education:  [x] Yes  [] No  [x] Reviewed Prior HEP/Ed: Continue stretching and pain free AROM  Method of Education: [x] Verbal  [] Demo  [] Written  Comprehension of Education:  [x] Verbalizes understanding. [] Demonstrates understanding. [] Needs review. [x] Demonstrates/verbalizes HEP/Ed previously given. Plan: [x] Continue current frequency toward long and short term goals. [x] Specific Instructions for subsequent treatments: Progress strength program and continue to focus on ROM.        Time In: 3:30pm           Time Out: 4:30pm    Electronically signed by:  Jose Manuel Srivastava PTA

## 2022-10-04 ENCOUNTER — OFFICE VISIT (OUTPATIENT)
Dept: ORTHOPEDIC SURGERY | Age: 47
End: 2022-10-04
Payer: COMMERCIAL

## 2022-10-04 VITALS
WEIGHT: 185 LBS | DIASTOLIC BLOOD PRESSURE: 101 MMHG | HEIGHT: 69 IN | HEART RATE: 66 BPM | RESPIRATION RATE: 12 BRPM | SYSTOLIC BLOOD PRESSURE: 182 MMHG | BODY MASS INDEX: 27.4 KG/M2

## 2022-10-04 DIAGNOSIS — S46.011A TRAUMATIC INCOMPLETE TEAR OF RIGHT ROTATOR CUFF, INITIAL ENCOUNTER: Primary | ICD-10-CM

## 2022-10-04 PROCEDURE — 99213 OFFICE O/P EST LOW 20 MIN: CPT | Performed by: PHYSICIAN ASSISTANT

## 2022-10-04 ASSESSMENT — ENCOUNTER SYMPTOMS
NAUSEA: 0
DIARRHEA: 0
ABDOMINAL DISTENTION: 0
COUGH: 0
RESPIRATORY NEGATIVE: 1
VOMITING: 0
COLOR CHANGE: 0
SHORTNESS OF BREATH: 0
CHEST TIGHTNESS: 0
ABDOMINAL PAIN: 0
APNEA: 0
CONSTIPATION: 0

## 2022-10-04 NOTE — PROGRESS NOTES
[address] [place] [address] AND SPORTS MEDICINE  [facility]ly [facility]  [address] [facility] [address]  Dept: [phone]  Dept Fax: [phone]        Ambulatory Follow Up-[facility]      Subjective:   Chastity Duval is a 48y.o. year old male who presents to our office today for routine followup regarding his   1. Traumatic incomplete tear of right rotator cuff, initial encounter      [facility] claim #: [id number]  DOI: 7/9/2022  DX:   s46.011A- strain right rotator cuff-hearing status  [id number]- rotator cuff tear-supraspinatus-hearing status  S46.911A-strain right shoulder-hearing status    Chief Complaint   Patient presents with    Shoulder Pain     R Shoulder f/u ( 8/23/22)       Date of Injury/surgery: 7/9/2022    HPI Chastity Duval  is a 48 y.o. Right hand dominant  male who presents today in follow for right shoulder pain. The patient was last seen on 8/23/2022 and underwent treatment in the form of right subacromial injection, meloxicam prescription and going to physical therapy. He has been going to physical therapy but has extreme pain still with range of motion. He states he cannot lift his arm past 90 degrees without significant pain. He was taking the meloxicam but then it started upsetting his stomach so he now he only takes it once in a while. He states he did not notice much difference with the injection. The patient notes zero improvement with the previous treatment. He is continue to work under the same restrictions that were provided under occupational health. 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 He has hired an  to help get this claim on [employer]. approved. Review of Systems   Constitutional:  Positive for activity change. Negative for appetite change, fatigue and fever. Respiratory: Negative.   Negative for apnea, cough, chest tightness and shortness of breath. Cardiovascular: Negative. Negative for chest pain, palpitations and leg swelling. Gastrointestinal:  Negative for abdominal distention, abdominal pain, constipation, diarrhea, nausea and vomiting. Genitourinary:  Negative for difficulty urinating, dysuria and hematuria. Musculoskeletal:  Positive for arthralgias. Negative for gait problem, joint swelling and myalgias. Skin:  Negative for color change and rash. Neurological:  Negative for dizziness, weakness, numbness and headaches. Psychiatric/Behavioral:  Positive for sleep disturbance. Objective :   BP (!) 182/101   Pulse 66   Resp 12   Ht 5' 9\" (1.753 m)   Wt 185 lb (83.9 kg)   BMI 27.32 kg/m²  Body mass index is 27.32 kg/m². General: Celia Garrison is a 48 y.o. male who is alert and oriented and sitting comfortably in our office. Orthopedics:     GENERAL: Alert and oriented X3 in no acute distress. SKIN: Intact without lesions or ulcerations. NEURO: Musculoskeletal and axillary nerves intact to sensory and motor testing. VASC: Capillary refill is less than 3 seconds. Right Shoulder Exam     GEN: Alert and oriented X 3, in no acute distress. SKIN: Intact without rashes, lesions, or ulcerations. NEURO: Musculoskeletal ans axillary nerves intact to sensory and motor testing. VASC: Cap refill less than than 3 secs. Negative Adson's test, Negative Anita's test.  ROM: 90a/130p vs 160(L) degrees of forward elevation, 40 vs 60 (L) degrees of external rotation in neutral, 45(R) vs. 90 (L) degrees of external rotation in abduction, internal rotation to L3 (R) vs. T10 (L). STRENGTH: Supraspinatus 4/5, external rotators 5/5. MUSC: No atrophy, negative subscap lift off or belly press test.  IMP: + Neer's sign, + Hawkin's sign, positive Coracoid impingement, + painful arc, + pain with cross body abduction.   PALP: no pain over anterolateral acromion, no pain over AC joint, no pain over traps/rhomboids. INST: negative Clayton's test, mild Speed's test    Radiology:   SHOULDER X-RAY     Two views of the right shoulder and 2 views of the scapula, including AP, scapular Y, outlet and axillary views reveal: The glenohumeral joint is well reduced no arthritic changes. Proximal migration of the humeral head has not occurred. Acromion is a type II. The acromioclavicular joint shows mild degenerative changes. Impression: Negative radiographs of the right shoulder      MRI SHOULDER RIGHT WO CONTRAST     Result Date: 7/21/2022  1. Less than 50% multifocal shallow partial-thickness articular surface and interstitial tearing of supraspinatus between musculotendinous junction and footplate. Moderate underlying supraspinatus tendinosis. 2. Mild infraspinatus tendinopathy. 3. No paralabral cyst formation or discrete labral tear. 4. Mild degenerative change of the right AC joint. Procedure: none    Assessment:      1. Traumatic incomplete tear of right rotator cuff, initial encounter       Plan:      We discussed the natural etiologies of BWC injuries. The patient does have a partial-thickness tearing of his supraspinatus at the musculotendinous junction and footplate. He does have significant tendinitis of that supraspinatus also. Some mild infraspinatus tendinopathy. He has not improved with the subacromial injection and physical therapy. He is shoulder is actually decreasing in range of motion since his previous visit. I am concerned that he starting to develop a frozen shoulder. With a frozen shoulder we cannot do a rotator cuff surgery even if we want to if the shoulder becomes frozen. His shoulder is so painful that he has difficulty moving it which will cause the frozen shoulder. The pain is caused from the rotator cuff tear and the tendinitis. I would like to do a C9 for a injection into the glenohumeral joint for the adhesive capsulitis and for more physical therapy.   He needs to continue with the same work restrictions of left-handed work only. I would like for him to follow-up after the C9 approval for the injection and we will do a glenohumeral injection to see how much pain it relieves and how much motion he can get back. After I do the injection I would like him to come back and follow-up with Dr. Pia Townsend to discuss surgical options in 6 weeks. He needs to continue working with his  to get the rotator cuff tear on his claim and out of hearing status for us to be able to even consider a surgery. Follow up:Return After C9 approval.        No orders of the defined types were placed in this encounter. No orders of the defined types were placed in this encounter. This note is created with the assistance of a speech recognition program.  While intending to generate a document that actually reflects the content of the visit, the document can still have some errors including those of syntax and sound a like substitutions which may escape proof reading. In such instances, actual meaning can be extrapolated by contextual diversion.      Electronically signed by Lacy Peña PA-C on 10/4/2022 at 4:52 PM

## 2022-10-05 ENCOUNTER — HOSPITAL ENCOUNTER (OUTPATIENT)
Dept: PHYSICAL THERAPY | Facility: CLINIC | Age: 47
Setting detail: THERAPIES SERIES
Discharge: HOME | End: 2022-10-05
Payer: COMMERCIAL

## 2022-10-05 ENCOUNTER — TELEPHONE (OUTPATIENT)
Dept: ORTHOPEDIC SURGERY | Age: 47
End: 2022-10-05

## 2022-10-05 PROCEDURE — 97016 VASOPNEUMATIC DEVICE THERAPY: CPT

## 2022-10-05 PROCEDURE — 97110 THERAPEUTIC EXERCISES: CPT

## 2022-10-05 NOTE — TELEPHONE ENCOUNTER
Yamel called for this patient, stating she needs an updated [employer] with the new updated restrictions that were given by [facility]. She stated if you had any questions you can call her at [phone]. Thank you.

## 2022-10-05 NOTE — FLOWSHEET NOTE
[x] THE [facility] &  Therapy  [facility]   [place]: [phone]  F: [phone]      Physical Therapy Daily Treatment Note    Date:  10/5/2022  Patient Name:  Izabela Oreilly    :  1975  MRN: [id number]  Physician: Jojo Kelly, [facility]                        Insurance: [facility] 8-3-22 to 9-15-22 for up to 12 visits, 4 units/visit MAX - Date extended to 10-15-22  Medical Diagnosis: RUE RTC Syndrome         Rehab Codes: M75.101, M62.81 (Muscle Weakness), M62.9 (Disorder of Muscle), M79.1 (Myalgia)  Onset Date: 22            Next 's appt. : 22    Visit# / total visits: 10/12     Cancels/No Shows:     Subjective:    Pain:  [x] Yes  [] No Location: RUE Shoulder Pain Rating: (0-10 scale) 6/10  Pain altered Tx:  [x] No  [] Yes  Action:  Comments: Patient notes continued soreness in R shoulder. Notes he saw referring physician and was told they are going to try to get more PT then a follow up with the surgeon.      Objective:  Precautions:   Exercise    Reps/ Time Weight/ Level Comments             Pulleys  4'/4'           Wall slides flexion  10x10\"  HEP-With towel   Wall slides abduction  10x10\"  HEP-With towel   Corner pec Stretch   3x30\"   HEP-At 90 degrees    Table slide flex 10x10\"       Table slide Scap 10x10\"             Supine       Wand Abd  10x10\"   HEP   Wand flexion AAROM   10x10\"   HEP   Wand ER AAROM   10x10\"   HEP             Band       Rows x20 Blue HEP   Ext x20 Blue HEP   ER  x20 Blue HEP   IR x20 Blue          Serratus punches  2x10 2#          Standing      Flexion 2x10     Scaption 2x10     Abduction 2x10  Limited motion due to pain         Prone      Extension 2x10     Rows 2x10     HAB 2x10           Side lying      ER 2x10     Abduction 2x10              R inferior joint glides to improve abduction ROM   Vasocompression 15 min mod pressure 34 degrees R shoulder-supine         Treatment Charges: Mins Units Time In/Out   [x]  Ther Exercise 40 3 3:30pm-4:10pm   []  Manual Therapy      [x]  Vasocompression 15 1 4:15pm-4:30pm   []  Other      Total Treatment time 55 min 4 3:30pm-4:30pm          Assessment: [x] Progressing toward goals. Continued strength progressions this date with continued focus on pain free ROM. Patient notes no complaint of increased pain or soreness if exercises performed below 90° of flexion or abduction. Patient still mostly limited in abduction due to pain. Will continue to monitor patients response to progressions and advance per tolerance. [] No change. [] Other:  [x] Patient would continue to benefit from skilled physical therapy services in order to: ROM, strength and flexibility to improve patient's overall RUE shoulder movement and return to work       Goals  MET NOT MET ON-  GOING  Details   Date Addressed:            STG: To be met in 10 treatments            1. ? Pain: Decrease pain levels to 4/10 with ADLs []  []  []      2. ? ROM: Increase flexibility and AROM limitations throughout to equal bilat to reduce difficulty with ADLs []  []  []      3. ? Strength: Increase MMT to 5/5 throughout to ease functional limitations and mobility  []  []  []      4. Independent with Home Exercise Programs []  []  []        []  []  []      Date Addressed:            LTG: To be met in 12 treatments           1. Improve score on assessment tool QUICK DASH from 95% impairment to less than 50% impairment  []  []  []      2. Reduce pain levels to 2/10 or less with ADLs []  []  []        []  []  []                     Patient goals: decrease pain, improve ROM       Pt. Education:  [x] Yes  [] No  [x] Reviewed Prior HEP/Ed: Continue strength program within pain free range. Method of Education: [x] Verbal  [] Demo  [] Written  Comprehension of Education:  [x] Verbalizes understanding. [] Demonstrates understanding. [] Needs review. [x] Demonstrates/verbalizes HEP/Ed previously given.      Plan: [x] Continue current frequency toward long and short term goals. [x] Specific Instructions for subsequent treatments: Continue strength and AROM progression within pain free range.         Time In: 3:30pm           Time Out: 4:30pm    Electronically signed by:  Jl Ch PTA

## 2022-10-06 NOTE — TELEPHONE ENCOUNTER
Anton Souza,     How long did you want to extend restrictions for this pt? I scanned the previous [employer]-14 (see media). Please Advise.

## 2022-10-10 ENCOUNTER — HOSPITAL ENCOUNTER (OUTPATIENT)
Dept: PHYSICAL THERAPY | Facility: CLINIC | Age: 47
Setting detail: THERAPIES SERIES
Discharge: HOME | End: 2022-10-10
Payer: COMMERCIAL

## 2022-10-10 NOTE — FLOWSHEET NOTE
[] [facility]) - [facility] &  Therapy  [address]    [phone]  F: [phone]   [] [address]  [facility] [address]   [address]  P: [phone]  F: [phone]  [] [address] &  Therapy  [address]  P: [phone]  F: [phone] [] [address]  P: [phone]  F: [phone]  [x] [address]  [address] [facility] [address]   [address]   [place]: [phone]  F: [phone]   [] [facility]  [address] [facility] [address]  [place]: [phone]   F: [phone]     Physical Therapy Cancel/No Show note    Date: 10/10/2022  Patient: Crystal Patel  : 1975  MRN: [id number]    Cancels/No Shows to date: 1    For today's appointment patient:    [x]  Cancelled    [] Rescheduled appointment    [] No-show     Reason given by patient:    []  Patient ill    []  Conflicting appointment    [] No transportation      [] Conflict with work    [x] No reason given    [] Weather related    [] [id number]    [] Other:      Comments:        [x] Next appointment was confirmed    Electronically signed by: Dung Neves

## 2022-10-12 ENCOUNTER — HOSPITAL ENCOUNTER (OUTPATIENT)
Dept: PHYSICAL THERAPY | Facility: CLINIC | Age: 47
Setting detail: THERAPIES SERIES
Discharge: HOME | End: 2022-10-12
Payer: COMMERCIAL

## 2022-10-12 PROCEDURE — 97016 VASOPNEUMATIC DEVICE THERAPY: CPT

## 2022-10-12 PROCEDURE — 97110 THERAPEUTIC EXERCISES: CPT

## 2022-10-12 NOTE — FLOWSHEET NOTE
[x] THE [facility]  [facility]   [place]: [phone]  [place]: [phone]      Physical Therapy Daily Treatment Note    Date:  10/12/2022  Patient Name:  Rachel London    :  1975  MRN: [id number]  Physician: Elana Quijano, [facility]                        Insurance: [facility] 8-3-22 to 9-15-22 for up to 12 visits, 4 units/visit MAX - Date extended to 10-15-22  Medical Diagnosis: RUE RTC Syndrome         Rehab Codes: M75.101, M62.81 (Muscle Weakness), M62.9 (Disorder of Muscle), M79.1 (Myalgia)  Onset Date: 22                    Next 's appt. : 22 (Dr. Fabiola Burns)    Visit# / total visits:      Cancels/No Shows:     Subjective:    Pain:  [x] Yes  [] No Location: RUE Shoulder Pain Rating: (0-10 scale) 6/10  Pain altered Tx:  [x] No  [] Yes  Action:  Comments: Patient notes continued pain with overhead activities. Patient states he is suppose to get a cortisone injection but he has to get approval first. Dr. Fabiola Burns. Patient has difficulty raising arm actively without pain and putting pressure through his RUE.         Objective:  Precautions:   Exercise    Reps/ Time Weight/ Level Comments             Pulleys  4'/4' Flex/abd          Wall slides flexion  10x10\"  HEP-With towel   Wall slides abduction  10x10\"  HEP-With towel   Corner pec Stretch   3x30\"   HEP-At 90 degrees    Table slide flex 10x10\"       Table slide Scap 10x10\"             Supine       Wand Abd  10x10\"   HEP   Wand flexion AAROM   10x10\"   HEP   Wand ER AAROM   10x10\"   HEP             Band       Rows x20 Blue HEP   Ext x20 Blue HEP   ER  x20 Blue HEP   IR x20 Blue          Serratus punches  2x10 2#          Standing      Flexion 2x10 0#    Scaption 2x10 0#    Abduction 2x10 0# Limited motion due to pain         Prone      Extension 2x10 3#    Rows 2x10 3#    HAB 2x10 0#          Side lying      ER 2x10     Abduction 2x10              R inferior joint glides to improve abduction ROM- not today   Vasocompression 15 min mod pressure 34 degrees R shoulder-supine     Specific instructions for next treatment:         Treatment Charges: Mins Units Time In/Out   [x]  Ther Exercise 40 3 3:30pm-4:10pm   []  Manual Therapy      [x]  Vasocompression 15 1 4:18pm-4:33pm   []  Other      Total Treatment time 55 min 4 3:30pm-4:33pm          Assessment: [x] Progressing toward goals. Continued strength program with cues to correct form with postural exercises. Patient with continued pain with shoulder ROM past 90 degrees of flexion and abduction. Limited ROM with abduction. Administered blue thera-band to perform UE strength program independently. Awaiting insurance approval for more visits and a date extension. [] No change. [] Other:  [x] Patient would continue to benefit from skilled physical therapy services in order to: ROM, strength and flexibility to improve patient's overall RUE shoulder movement and return to work       Goals  MET NOT MET ON-  GOING  Details   Date Addressed:            STG: To be met in 10 treatments            1. ? Pain: Decrease pain levels to 4/10 with ADLs []  []  []      2. ? ROM: Increase flexibility and AROM limitations throughout to equal bilat to reduce difficulty with ADLs []  []  []      3. ? Strength: Increase MMT to 5/5 throughout to ease functional limitations and mobility  []  []  []      4. Independent with Home Exercise Programs []  []  []        []  []  []      Date Addressed:            LTG: To be met in 12 treatments           1. Improve score on assessment tool QUICK DASH from 95% impairment to less than 50% impairment  []  []  []      2. Reduce pain levels to 2/10 or less with ADLs []  []  []        []  []  []                     Patient goals: decrease pain, improve ROM       Pt.  Education:  [x] Yes  [] No  [x] Reviewed Prior HEP/Ed: Continue strength program.   Method of Education: [x] Verbal  [] Demo  [] Written  Comprehension of Education:  [x] Verbalizes understanding. [x] Demonstrates understanding. [] Needs review. [x] Demonstrates/verbalizes HEP/Ed previously given. Plan: [x] Continue current frequency toward long and short term goals.           Time In: 3:30pm           Time Out: 4:33pm    Electronically signed by:  Davey Mills PTA

## 2022-12-08 ENCOUNTER — OFFICE VISIT (OUTPATIENT)
Dept: ORTHOPEDIC SURGERY | Age: 47
End: 2022-12-08

## 2022-12-08 VITALS — RESPIRATION RATE: 12 BRPM | HEIGHT: 69 IN | BODY MASS INDEX: 27.4 KG/M2 | WEIGHT: 185 LBS

## 2022-12-08 DIAGNOSIS — S46.911D STRAIN OF RIGHT SHOULDER, SUBSEQUENT ENCOUNTER: ICD-10-CM

## 2022-12-08 DIAGNOSIS — S46.011D STRAIN OF TENDON OF RIGHT ROTATOR CUFF, SUBSEQUENT ENCOUNTER: Primary | ICD-10-CM

## 2022-12-08 ASSESSMENT — ENCOUNTER SYMPTOMS
CHEST TIGHTNESS: 0
SHORTNESS OF BREATH: 0
ABDOMINAL DISTENTION: 0
NAUSEA: 0
CONSTIPATION: 0
ABDOMINAL PAIN: 0
DIARRHEA: 0
COUGH: 0
APNEA: 0
VOMITING: 0
COLOR CHANGE: 0
RESPIRATORY NEGATIVE: 1

## 2022-12-08 NOTE — PROGRESS NOTES
[address] [place] [address] AND SPORTS MEDICINE  [facility]  [address] [address]  Dept: [phone]  Dept Fax: [phone]        Ambulatory Follow Up      Subjective:   Noé Estevez is a 48y.o. year old male who presents to our office today for routine followup regarding his   1. Strain of tendon of right rotator cuff, subsequent encounter    2. Strain of right shoulder, subsequent encounter    . Chief Complaint   Patient presents with    Shoulder Pain     R Shoulder      [facility] #: [id number]  DOI: 7/9/2022  Dx: P74.224B- Right RTC  strain         O51.247B- Right Strain at shoulder      Date of Injury/surgery: 7/20/2022    HPI Noé Estevez  is a 48 y.o. Right hand dominant  male who presents today in follow for right shoulder pain. The patient was last seen on 10/4/2022 and underwent treatment in the form of submitting a C9 for a cortisone injection into the right shoulder. That request was denied by [employer]. Most likely because his claims were in the hearing status. They have now been allowed for the above to claims. He states its been 6 months and his shoulder is feeling much better. He has not been working for the last 2 weeks. He has been doing his exercises at home. He states his shoulder is 95% better and he would like to return to work with no restrictions. He states right now his shoulder is not even keeping him awake anymore. Review of Systems   Constitutional:  Positive for activity change. Negative for appetite change, fatigue and fever. Respiratory: Negative. Negative for apnea, cough, chest tightness and shortness of breath. Cardiovascular: Negative. Negative for chest pain, palpitations and leg swelling. Gastrointestinal:  Negative for abdominal distention, abdominal pain, constipation, diarrhea, nausea and vomiting.    Genitourinary:  Negative for difficulty urinating, dysuria and hematuria. Musculoskeletal:  Negative for arthralgias, gait problem, joint swelling and myalgias. Skin:  Negative for color change and rash. Neurological:  Negative for dizziness, weakness, numbness and headaches. Psychiatric/Behavioral:  Negative for sleep disturbance. Objective :   Resp 12   Ht 5' 9\" (1.753 m)   Wt 185 lb (83.9 kg)   BMI 27.32 kg/m²  Body mass index is 27.32 kg/m². General: Theresa Huffman is a 48 y.o. male who is alert and oriented and sitting comfortably in our office. Orthopedics:     GENERAL: Alert and oriented X3 in no acute distress. SKIN: Intact without lesions or ulcerations. NEURO: Musculoskeletal and axillary nerves intact to sensory and motor testing. VASC: Capillary refill is less than 3 seconds. Right Shoulder Exam     GEN: Alert and oriented X 3, in no acute distress. SKIN: Intact without rashes, lesions, or ulcerations. NEURO: Musculoskeletal ans axillary nerves intact to sensory and motor testing. VASC: Cap refill less than than 3 secs. Negative Adson's test, Negative Anita's test.  ROM: 160 degrees of forward elevation, 60 degrees of external rotation in neutral,  90 degrees of external rotation in abduction, internal rotation to T10 . STRENGTH: Supraspinatus 5/5, external rotators 5/5. MUSC: No atrophy, negative subscap lift off or belly press test.  IMP: Negative Neer's sign, + Hawkin's sign, positive Coracoid impingement, + painful arc, + pain with cross body abduction. PALP: no pain over anterolateral acromion, no pain over AC joint, no pain over traps/rhomboids. INST: negative Belcher's test, mild Speed's test     Radiology: Previous x-ray and MRI reviewed    Procedure: None    Assessment:      1. Strain of tendon of right rotator cuff, subsequent encounter    2. Strain of right shoulder, subsequent encounter       Plan:   We discussed the natural etiologies of a rotator cuff strain.   He is feeling so much better with the physical